# Patient Record
Sex: FEMALE
[De-identification: names, ages, dates, MRNs, and addresses within clinical notes are randomized per-mention and may not be internally consistent; named-entity substitution may affect disease eponyms.]

---

## 2018-06-17 ENCOUNTER — HOSPITAL ENCOUNTER (EMERGENCY)
Dept: HOSPITAL 5 - ED | Age: 63
Discharge: HOME | End: 2018-06-17
Payer: COMMERCIAL

## 2018-06-17 VITALS — SYSTOLIC BLOOD PRESSURE: 105 MMHG | DIASTOLIC BLOOD PRESSURE: 72 MMHG
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
## 2018-06-17 DIAGNOSIS — Z88.5: ICD-10-CM

## 2018-06-17 DIAGNOSIS — S13.4XXA: ICD-10-CM

## 2018-06-17 DIAGNOSIS — F17.200: ICD-10-CM

## 2018-06-17 DIAGNOSIS — S63.502A: ICD-10-CM
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
## 2018-06-17 DIAGNOSIS — S39.012A: Primary | ICD-10-CM

## 2018-06-17 PROCEDURE — 72100 X-RAY EXAM L-S SPINE 2/3 VWS: CPT

## 2018-06-17 PROCEDURE — 72040 X-RAY EXAM NECK SPINE 2-3 VW: CPT
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
